# Patient Record
Sex: FEMALE | Race: WHITE | NOT HISPANIC OR LATINO
[De-identification: names, ages, dates, MRNs, and addresses within clinical notes are randomized per-mention and may not be internally consistent; named-entity substitution may affect disease eponyms.]

---

## 2019-01-11 PROBLEM — Z00.00 ENCOUNTER FOR PREVENTIVE HEALTH EXAMINATION: Status: ACTIVE | Noted: 2019-01-11

## 2020-11-04 ENCOUNTER — FORM ENCOUNTER (OUTPATIENT)
Age: 50
End: 2020-11-04

## 2021-01-12 ENCOUNTER — FORM ENCOUNTER (OUTPATIENT)
Age: 51
End: 2021-01-12

## 2021-05-25 ENCOUNTER — FORM ENCOUNTER (OUTPATIENT)
Age: 51
End: 2021-05-25

## 2021-05-26 VITALS
SYSTOLIC BLOOD PRESSURE: 112 MMHG | WEIGHT: 188 LBS | BODY MASS INDEX: 28.49 KG/M2 | HEIGHT: 68 IN | DIASTOLIC BLOOD PRESSURE: 69 MMHG

## 2022-10-21 ENCOUNTER — NON-APPOINTMENT (OUTPATIENT)
Age: 52
End: 2022-10-21

## 2022-10-21 DIAGNOSIS — Z78.9 OTHER SPECIFIED HEALTH STATUS: ICD-10-CM

## 2022-10-21 DIAGNOSIS — Z87.42 PERSONAL HISTORY OF OTHER DISEASES OF THE FEMALE GENITAL TRACT: ICD-10-CM

## 2022-10-21 DIAGNOSIS — O09.529 SUPERVISION OF ELDERLY MULTIGRAVIDA, UNSPECIFIED TRIMESTER: ICD-10-CM

## 2022-10-21 DIAGNOSIS — O30.009 TWIN PREGNANCY, UNSPECIFIED NUMBER OF PLACENTA AND UNSPECIFIED NUMBER OF AMNIOTIC SACS, UNSPECIFIED TRIMESTER: ICD-10-CM

## 2022-10-21 DIAGNOSIS — Z87.59 PERSONAL HISTORY OF OTHER COMPLICATIONS OF PREGNANCY, CHILDBIRTH AND THE PUERPERIUM: ICD-10-CM

## 2022-10-21 DIAGNOSIS — Z97.5 PRESENCE OF (INTRAUTERINE) CONTRACEPTIVE DEVICE: ICD-10-CM

## 2022-10-21 DIAGNOSIS — N84.0 POLYP OF CORPUS UTERI: ICD-10-CM

## 2022-10-21 DIAGNOSIS — Z98.890 OTHER SPECIFIED POSTPROCEDURAL STATES: ICD-10-CM

## 2022-10-31 ENCOUNTER — APPOINTMENT (OUTPATIENT)
Dept: OBGYN | Facility: CLINIC | Age: 52
End: 2022-10-31

## 2023-01-05 ENCOUNTER — APPOINTMENT (OUTPATIENT)
Dept: OBGYN | Facility: CLINIC | Age: 53
End: 2023-01-05

## 2023-01-19 ENCOUNTER — APPOINTMENT (OUTPATIENT)
Dept: OBGYN | Facility: CLINIC | Age: 53
End: 2023-01-19
Payer: COMMERCIAL

## 2023-01-19 VITALS
HEIGHT: 68 IN | WEIGHT: 191 LBS | SYSTOLIC BLOOD PRESSURE: 121 MMHG | BODY MASS INDEX: 28.95 KG/M2 | DIASTOLIC BLOOD PRESSURE: 83 MMHG

## 2023-01-19 DIAGNOSIS — Z30.432 ENCOUNTER FOR REMOVAL OF INTRAUTERINE CONTRACEPTIVE DEVICE: ICD-10-CM

## 2023-01-19 DIAGNOSIS — Z01.419 ENCOUNTER FOR GYNECOLOGICAL EXAMINATION (GENERAL) (ROUTINE) W/OUT ABNORMAL FINDINGS: ICD-10-CM

## 2023-01-19 LAB
BILIRUB UR QL STRIP: NORMAL
GLUCOSE UR-MCNC: NORMAL
HCG UR QL: 0.2 EU/DL
HCG UR QL: NEGATIVE
HGB UR QL STRIP.AUTO: NORMAL
KETONES UR-MCNC: NORMAL
LEUKOCYTE ESTERASE UR QL STRIP: NORMAL
NITRITE UR QL STRIP: NORMAL
PH UR STRIP: 7
PROT UR STRIP-MCNC: NORMAL
SP GR UR STRIP: 1.01

## 2023-01-19 PROCEDURE — 81003 URINALYSIS AUTO W/O SCOPE: CPT | Mod: QW

## 2023-01-19 PROCEDURE — 99396 PREV VISIT EST AGE 40-64: CPT | Mod: 25

## 2023-01-19 PROCEDURE — 58301 REMOVE INTRAUTERINE DEVICE: CPT

## 2023-01-19 PROCEDURE — 81025 URINE PREGNANCY TEST: CPT

## 2023-01-19 NOTE — PROCEDURE
[IUD Removal] : intrauterine device (IUD) removal [ IUD] :  IUD [Speculum Placed] : speculum placed [IUD Removed - Forceps] : IUD removed - forceps

## 2023-01-19 NOTE — PLAN
[FreeTextEntry1] : pap taken \par iud removed\par discussed normal menopausal changes\par explained hormone levels in this situation are not helpful\par explained risks of HRT and testoterone patches/cream and data not in that it helps for libido.\par valeria BASURTO

## 2023-01-19 NOTE — HISTORY OF PRESENT ILLNESS
[FreeTextEntry1] : 51 yo [id number] , lmp 2 years ago has paraguard. Here for annual and to remove iud.\par \par since menopause libido is lower.She was at a lecture and was recommended to " check hormone levels". no med hx. c/s twins no other surgery.\par Up to date mammo. Scheduled a colonoscopy.

## 2023-01-22 LAB
HPV HIGH+LOW RISK DNA PNL CVX: NOT DETECTED
PROGEST SERPL-MCNC: 0.2 NG/ML
TSH SERPL-ACNC: 1.92 UIU/ML

## 2023-01-24 LAB — CYTOLOGY CVX/VAG DOC THIN PREP: NORMAL

## 2023-01-25 LAB
ACTINOMYCES CULTURE FOR IUD: NORMAL
ESTROGEN SERPL-MCNC: 28 PG/ML
TESTOST FREE SERPL-MCNC: 0.9 PG/ML
TESTOST SERPL-MCNC: 8.7 NG/DL

## 2023-06-01 ENCOUNTER — APPOINTMENT (OUTPATIENT)
Dept: OBGYN | Facility: CLINIC | Age: 53
End: 2023-06-01
Payer: COMMERCIAL

## 2023-06-01 VITALS
SYSTOLIC BLOOD PRESSURE: 114 MMHG | DIASTOLIC BLOOD PRESSURE: 74 MMHG | WEIGHT: 191 LBS | HEIGHT: 68 IN | BODY MASS INDEX: 28.95 KG/M2

## 2023-06-01 DIAGNOSIS — N95.0 POSTMENOPAUSAL BLEEDING: ICD-10-CM

## 2023-06-01 LAB
BILIRUB UR QL STRIP: NORMAL
GLUCOSE UR-MCNC: NORMAL
HCG UR QL: 0.2 EU/DL
HCG UR QL: NEGATIVE
HGB UR QL STRIP.AUTO: NORMAL
KETONES UR-MCNC: NORMAL
LEUKOCYTE ESTERASE UR QL STRIP: NORMAL
NITRITE UR QL STRIP: NORMAL
PH UR STRIP: 5.5
PROT UR STRIP-MCNC: NORMAL
SP GR UR STRIP: <=1.005

## 2023-06-01 PROCEDURE — 58100 BIOPSY OF UTERUS LINING: CPT

## 2023-06-01 PROCEDURE — 99213 OFFICE O/P EST LOW 20 MIN: CPT | Mod: 25

## 2023-06-01 PROCEDURE — 76830 TRANSVAGINAL US NON-OB: CPT

## 2023-06-01 PROCEDURE — 81003 URINALYSIS AUTO W/O SCOPE: CPT | Mod: QW

## 2023-06-01 PROCEDURE — 81025 URINE PREGNANCY TEST: CPT

## 2023-06-01 NOTE — PROCEDURE
[Abnormal Uterine Bleeding] : abnormal uterine bleeding [Transvaginal Ultrasound] : transvaginal ultrasound [FreeTextEntry3] : AV uteurs, 6mm EMS, no ff, no masses, b/l adnexa wnl L w/ small 1cm simple cyst

## 2023-06-01 NOTE — HISTORY OF PRESENT ILLNESS
[FreeTextEntry1] : pt here for PMB. not very heavy but dark old blood. valsalva to have bowel movemetn and saw increased amount of blood. has had like pink spotting the last few weeks but today was heavier and darker. no clots. no anemic sx. last pap 1/23 NILM

## 2023-06-02 LAB
HCT VFR BLD CALC: 40.5 %
HGB BLD-MCNC: 13 G/DL
MCHC RBC-ENTMCNC: 29.1 PG
MCHC RBC-ENTMCNC: 32.1 GM/DL
MCV RBC AUTO: 90.6 FL
PLATELET # BLD AUTO: 181 K/UL
RBC # BLD: 4.47 M/UL
RBC # FLD: 13.6 %
TSH SERPL-ACNC: 2.54 UIU/ML
WBC # FLD AUTO: 6.33 K/UL

## 2023-06-06 ENCOUNTER — APPOINTMENT (OUTPATIENT)
Dept: OBGYN | Facility: CLINIC | Age: 53
End: 2023-06-06

## 2025-05-14 ENCOUNTER — APPOINTMENT (OUTPATIENT)
Dept: OBGYN | Facility: CLINIC | Age: 55
End: 2025-05-14
Payer: COMMERCIAL

## 2025-05-14 VITALS
HEART RATE: 64 BPM | WEIGHT: 187 LBS | DIASTOLIC BLOOD PRESSURE: 71 MMHG | BODY MASS INDEX: 28.43 KG/M2 | SYSTOLIC BLOOD PRESSURE: 108 MMHG

## 2025-05-14 LAB
BILIRUB UR QL STRIP: NORMAL
GLUCOSE UR-MCNC: NORMAL
HCG UR QL: 0.2 EU/DL
HCG UR QL: NEGATIVE
HGB UR QL STRIP.AUTO: NORMAL
KETONES UR-MCNC: NORMAL
LEUKOCYTE ESTERASE UR QL STRIP: NORMAL
NITRITE UR QL STRIP: NORMAL
PH UR STRIP: 6
PROT UR STRIP-MCNC: NORMAL
SP GR UR STRIP: <=1.005

## 2025-05-14 PROCEDURE — 99213 OFFICE O/P EST LOW 20 MIN: CPT | Mod: 25

## 2025-05-14 PROCEDURE — 99396 PREV VISIT EST AGE 40-64: CPT

## 2025-05-14 PROCEDURE — 99459 PELVIC EXAMINATION: CPT

## 2025-05-14 PROCEDURE — 81025 URINE PREGNANCY TEST: CPT

## 2025-05-14 PROCEDURE — 81003 URINALYSIS AUTO W/O SCOPE: CPT | Mod: QW

## 2025-05-14 RX ORDER — ESTRADIOL 0.1 MG/G
0.1 CREAM VAGINAL
Qty: 1 | Refills: 3 | Status: ACTIVE | COMMUNITY
Start: 2025-05-14 | End: 1900-01-01